# Patient Record
Sex: FEMALE | Race: OTHER | Employment: FULL TIME | URBAN - METROPOLITAN AREA
[De-identification: names, ages, dates, MRNs, and addresses within clinical notes are randomized per-mention and may not be internally consistent; named-entity substitution may affect disease eponyms.]

---

## 2018-03-07 ENCOUNTER — TELEPHONE (OUTPATIENT)
Dept: CARDIOLOGY CLINIC | Age: 48
End: 2018-03-07

## 2018-03-07 NOTE — TELEPHONE ENCOUNTER
Left message requesting patient call back and leave physician's contact information for our office to request records.       Future Appointments  Date Time Provider Shmuel Jerica   3/8/2018 2:40 PM Mariah Coleman  [address]

## 2018-03-07 NOTE — TELEPHONE ENCOUNTER
Called patient. Verified patient's identity with two identifiers. Patient states she was at [facility]. She will bring a copy of her ekg that she is picking up. I told her I would also request records. Patient verbalizes understanding and denies further questions or concerns.        Faxed STAT records request.

## 2018-03-08 ENCOUNTER — CLINICAL SUPPORT (OUTPATIENT)
Dept: CARDIOLOGY CLINIC | Age: 48
End: 2018-03-08

## 2018-03-08 ENCOUNTER — TELEPHONE (OUTPATIENT)
Dept: CARDIOLOGY CLINIC | Age: 48
End: 2018-03-08

## 2018-03-08 ENCOUNTER — OFFICE VISIT (OUTPATIENT)
Dept: CARDIOLOGY CLINIC | Age: 48
End: 2018-03-08

## 2018-03-08 VITALS
HEART RATE: 85 BPM | HEIGHT: 62 IN | BODY MASS INDEX: 39.56 KG/M2 | SYSTOLIC BLOOD PRESSURE: 123 MMHG | OXYGEN SATURATION: 97 % | DIASTOLIC BLOOD PRESSURE: 80 MMHG | WEIGHT: 215 LBS

## 2018-03-08 DIAGNOSIS — R06.02 SOB (SHORTNESS OF BREATH): ICD-10-CM

## 2018-03-08 DIAGNOSIS — Z82.49 FAMILY HISTORY OF PREMATURE CAD: ICD-10-CM

## 2018-03-08 DIAGNOSIS — R00.2 PALPITATIONS: Primary | ICD-10-CM

## 2018-03-08 DIAGNOSIS — R42 DIZZY: ICD-10-CM

## 2018-03-08 DIAGNOSIS — R94.31 ABNORMAL EKG: ICD-10-CM

## 2018-03-08 RX ORDER — NAPROXEN 500 MG/1
500 TABLET ORAL AS NEEDED
COMMUNITY

## 2018-03-08 RX ORDER — ESOMEPRAZOLE MAGNESIUM 40 MG/1
CAPSULE, DELAYED RELEASE ORAL DAILY
COMMUNITY

## 2018-03-08 RX ORDER — LORAZEPAM 0.5 MG/1
TABLET ORAL AS NEEDED
COMMUNITY

## 2018-03-08 RX ORDER — QUETIAPINE FUMARATE 25 MG/1
75 TABLET, FILM COATED ORAL AS NEEDED
COMMUNITY

## 2018-03-08 RX ORDER — DULOXETIN HYDROCHLORIDE 60 MG/1
60 CAPSULE, DELAYED RELEASE ORAL DAILY
COMMUNITY

## 2018-03-08 NOTE — PROGRESS NOTES
HISTORY OF PRESENT ILLNESS  Ann Fajardo is a 51 y.o. female     SUMMARY:   Problem List  Date Reviewed: 3/8/2018    None          No current outpatient prescriptions on file prior to visit. No current facility-administered medications on file prior to visit. CARDIOLOGY STUDIES TO DATE:  none  Chief Complaint   Patient presents with    Rapid Heart Rate     HPI :  Ms. Roberta Cat is a 51year-old referred from [facility] for cardiac evaluation. On 03/04/2018 she had an episode where she felt dizzy and lightheaded and her arms were tingling. She ended up at [facility].  She was not orthostatic. Her heart rate was initially 108 beats per minute and her Chem-6 and CBC were normal, except for slightly low hemoglobin of 11.5. EKG showed sinus rhythm with a Q-wave in lead III and an insignificant Q-wave in aVF. She cannot remember having a prior EKG. For years, she has had problems with shortness of breath with activity, as well as palpitations occurring once a week and lasting from one to ten minutes, which she has always attributed to anxiety. She has random episodes of chest pressure that occur with and without activity and are not associated with any other symptoms. She has a history of a heart murmur. She has gained more than 20 pounds over the last year and gets no regular exercise. Her brother had a heart attack at age 54. There is no history of hypertension or diabetes. She thinks her cholesterol has been okay and she has never smoked.         CARDIAC ROS:   negative for syncope, orthopnea, paroxysmal nocturnal dyspnea, exertional chest pressure/discomfort, claudication, lower extremity edema    Family History   Problem Relation Age of Onset    Heart Attack Brother 54     smoker       Past Medical History:   Diagnosis Date    Anxiety        GENERAL ROS:  A comprehensive review of systems was negative except for: Gastrointestinal: positive for reflux symptoms, nausea and constipation  Genitourinary: positive for frequency  Behvioral/Psych: positive for anxiety, depression and sleep disturbance    Visit Vitals    /80    Pulse 85    Ht 5' 1.5\" (1.562 m)    Wt 215 lb (97.5 kg)    SpO2 97%    BMI 39.97 kg/m2       Wt Readings from Last 3 Encounters:   03/08/18 215 lb (97.5 kg)            BP Readings from Last 3 Encounters:   03/08/18 123/80       PHYSICAL EXAM  General appearance: alert, cooperative, no distress, appears stated age  Neurologic: Alert and oriented X 3  Neck: supple, symmetrical, trachea midline, no adenopathy, no carotid bruit and no JVD  Lungs: clear to auscultation bilaterally  Heart: regular rate and rhythm, S1, S2 normal, no murmur, click, rub or gallop  Abdomen: soft, non-tender. Bowel sounds normal. No masses,  no organomegaly  Extremities: extremities normal, atraumatic, no cyanosis or edema  Pulses: 2+ and symmetric    Lab Results   Component Value Date/Time    Cholesterol, total 184 11/12/2009 09:20 AM    HDL Cholesterol 60 11/12/2009 09:20 AM    LDL, calculated 112.8 (H) 11/12/2009 09:20 AM    Triglyceride 56 11/12/2009 09:20 AM    CHOL/HDL Ratio 3.1 11/12/2009 09:20 AM     ASSESSMENT  Ms. Kiki Allred dizzy episode does not sound cardiac given her history and the fact she was not having any palpitations at that time. I suspect it was some sort of anxiety related issue given her history. Given her history of a murmur and her abnormal EKG. I do think she needs an echocardiogram and we are going to provide her with an event monitor to see if these palpitations are anything more than simple PVCs or premature atrial contractions. I do not think she needs stress testing at this point. current treatment plan is effective, no change in therapy  lab results and schedule of future lab studies reviewed with patient  reviewed diet, exercise and weight control    Encounter Diagnoses   Name Primary?     Palpitations Yes    SOB (shortness of breath)     Abnormal EKG     Family history of premature CAD     Dizzy      Orders Placed This Encounter    2D ECHO COMPLETE ADULT (TTE) W OR WO CONTR    QUEtiapine (SEROQUEL) 25 mg tablet    LORazepam (ATIVAN) 0.5 mg tablet    fluticasone (FLOVENT DISKUS) 50 mcg/actuation inhaler    DULoxetine (CYMBALTA) 60 mg capsule    esomeprazole (NEXIUM) 40 mg capsule    BUTALB/ACETAMINOPHEN/CAFFEINE (BUTALBITAL-ACETAMINOPHEN-CAFF PO)    naproxen (NAPROSYN) 500 mg tablet       Follow-up Disposition:  Return in about 6 weeks (around 4/19/2018).     Nikki Maxwell MD  3/8/2018

## 2018-03-08 NOTE — TELEPHONE ENCOUNTER
Dr. Ольга Landeros would like patient to have an event monitor mailed to her dx palpitations, SOB, dizziness.

## 2018-03-08 NOTE — MR AVS SNAPSHOT
[address] [facility] [address] [place] 
[phone] Patient: Jordyn Darden MRN: [id number] :[id number] Visit Information Date & Time Provider Department Dept. Phone Encounter #  
 3/8/2018  2:40 PM Jeison Slaughter MD [facility]emaker [phone] [id number] Follow-up Instructions Return in about 6 weeks (around 2018). Your Appointments 3/8/2018  2:40 PM  
New Patient with Jeison Slaughter MD  
[facility] ([address] [facility]) Appt Note: np ref by Dr. Curtis Bachelor dx elevated heart rate [address]  [address] [place]  
[address] [address] Upcoming Health Maintenance Date Due DTaP/Tdap/Td series (1 - Tdap) 1990 PAP AKA CERVICAL CYTOLOGY 1990 Influenza Age 5 to Adult 2017 Allergies as of 3/8/2018  Review Complete On: 3/8/2018 By: Jeison Slaughter MD  
  
 Severity Noted Reaction Type Reactions Other Medication  2018    Palpitations Narcotics- side effects: itchiness, palpitations Other Plant, Animal, Environmental  2018    Itching Current Immunizations  Never Reviewed No immunizations on file. Not reviewed this visit You Were Diagnosed With   
  
 Codes Comments Palpitations    -  Primary ICD-10-CM: R00.2 ICD-9-CM: 785.1 Vitals BP Pulse Height(growth percentile) Weight(growth percentile) SpO2 BMI  
 123/80 85 5' 1.5\" (1.562 m) 215 lb (97.5 kg) 97% 39.97 kg/m2 Smoking Status Never Smoker Vitals History BMI and BSA Data Body Mass Index Body Surface Area  
 39.97 kg/m 2 2.06 m 2 Preferred Pharmacy Pharmacy Name Phone [facility] - [place] [facility] [phone] Your Updated Medication List  
  
   
 This list is accurate as of 3/8/18  2:37 PM.  Always use your most recent med list.  
  
  
  
  
 BUTALBITAL-ACETAMINOPHEN-CAFF PO Take  by mouth as needed. CYMBALTA 60 mg capsule Generic drug:  DULoxetine Take 60 mg by mouth daily. esomeprazole 40 mg capsule Commonly known as:  Elizabeth Browner Take  by mouth daily. fluticasone 50 mcg/actuation inhaler Commonly known as:  FLOVENT DISKUS Take  by inhalation. LORazepam 0.5 mg tablet Commonly known as:  ATIVAN Take  by mouth. naproxen 500 mg tablet Commonly known as:  NAPROSYN Take 500 mg by mouth as needed. QUEtiapine 25 mg tablet Commonly known as:  SEROquel Take 75 mg by mouth as needed. Follow-up Instructions Return in about 6 weeks (around 4/19/2018). Introducing [facility]! [facility] introduces [employer] patient portal. Now you can access parts of your medical record, email your doctor's office, and request medication refills online. 1. In your internet browser, go to [email or web address] 2. Click on the First Time User? Click Here link in the Sign In box. You will see the New Member Sign Up page. 3. Enter your [employer] Access Code exactly as it appears below. You will not need to use this code after youve completed the sign-up process. If you do not sign up before the expiration date, you must request a new code. · [employer] Access Code: [email or web address] Expires: 6/6/2018  2:37 PM 
 
4. Enter the last four digits of your Social Security Number (xxxx) and Date of Birth (mm/dd/yyyy) as indicated and click Submit. You will be taken to the next sign-up page. 5. Create a [employer] ID. This will be your [employer] login ID and cannot be changed, so think of one that is secure and easy to remember. 6. Create a [employer] password. You can change your password at any time. 7. Enter your Password Reset Question and Answer.  This can be used at a later time if you forget your password. 8. Enter your e-mail address. You will receive e-mail notification when new information is available in [address] 9. Click Sign Up. You can now view and download portions of your medical record. 10. Click the Download Summary menu link to download a portable copy of your medical information. If you have questions, please visit the Frequently Asked Questions section of the [employer] website. Remember, [employer] is NOT to be used for urgent needs. For medical emergencies, dial 911. Now available from your iPhone and Android! Please provide this summary of care documentation to your next provider. Your primary care clinician is listed as Jayla Zavala. If you have any questions after today's visit, please call [phone].

## 2018-03-12 ENCOUNTER — CLINICAL SUPPORT (OUTPATIENT)
Dept: CARDIOLOGY CLINIC | Age: 48
End: 2018-03-12

## 2018-03-12 DIAGNOSIS — R00.2 PALPITATIONS: ICD-10-CM

## 2018-03-13 ENCOUNTER — TELEPHONE (OUTPATIENT)
Dept: CARDIOLOGY CLINIC | Age: 48
End: 2018-03-13

## 2018-03-13 NOTE — TELEPHONE ENCOUNTER
Called patient. Verified patient's identity with two identifiers. Notified patient of results. Patient verbalizes understanding and denies further questions or concerns.

## 2018-03-13 NOTE — TELEPHONE ENCOUNTER
----- Message from Estefania Zamarripa MD sent at 3/13/2018  4:47 PM EDT -----  Heart muscle is strong and valves all ok.  Looks great

## 2018-04-16 ENCOUNTER — TELEPHONE (OUTPATIENT)
Dept: CARDIOLOGY CLINIC | Age: 48
End: 2018-04-16

## 2018-04-16 NOTE — TELEPHONE ENCOUNTER
Pt called to cancel her appointment on 4/19 because she doesn't know if her results from her monitor will be back by then. Please give her a call back and let her know if she needs to reschedule or keep the appointment @ [phone]. Thanks!

## 2018-04-16 NOTE — TELEPHONE ENCOUNTER
Called patient. Verified patient's identity with two identifiers. I told her she can keep appointment. Patient verbalizes understanding and denies further questions or concerns.        Future Appointments  Date Time Provider Shmuel Pizano   4/19/2018 1:20 PM Nikhil Hernández  [address]

## 2018-04-19 ENCOUNTER — OFFICE VISIT (OUTPATIENT)
Dept: CARDIOLOGY CLINIC | Age: 48
End: 2018-04-19

## 2018-04-19 VITALS
OXYGEN SATURATION: 96 % | DIASTOLIC BLOOD PRESSURE: 84 MMHG | HEART RATE: 92 BPM | SYSTOLIC BLOOD PRESSURE: 118 MMHG | WEIGHT: 210.4 LBS | HEIGHT: 62 IN | RESPIRATION RATE: 16 BRPM | BODY MASS INDEX: 38.72 KG/M2

## 2018-04-19 DIAGNOSIS — R00.2 PALPITATIONS: Primary | ICD-10-CM

## 2018-04-19 DIAGNOSIS — R42 DIZZY: ICD-10-CM

## 2018-04-19 DIAGNOSIS — R06.02 SOB (SHORTNESS OF BREATH): ICD-10-CM

## 2018-04-19 DIAGNOSIS — Z82.49 FAMILY HISTORY OF PREMATURE CAD: ICD-10-CM

## 2018-04-19 NOTE — MR AVS SNAPSHOT
[address] [address] 
[phone] Patient: Scooter Larios MRN: [id number] :[id number] Visit Information Date & Time Provider Department Dept. Phone Encounter #  
 2018  1:20 PM Jag Pinzon MD [facility] [phone] [id number] Follow-up Instructions Return if symptoms worsen or fail to improve. Upcoming Health Maintenance Date Due DTaP/Tdap/Td series (1 - Tdap) 1990 PAP AKA CERVICAL CYTOLOGY 1990 Influenza Age 5 to Adult 2017 Allergies as of 2018  Review Complete On: 2018 By: Jag Pinzon MD  
  
 Severity Noted Reaction Type Reactions Other Medication  2018    Palpitations Narcotics- side effects: itchiness, palpitations Other Plant, Animal, Environmental  2018    Itching Current Immunizations  Never Reviewed No immunizations on file. Not reviewed this visit You Were Diagnosed With   
  
 Codes Comments Palpitations    -  Primary ICD-10-CM: R00.2 ICD-9-CM: 785.1 SOB (shortness of breath)     ICD-10-CM: R06.02 
ICD-9-CM: 786.05 Family history of premature CAD     ICD-10-CM: Z82.49 
ICD-9-CM: V17.3 Dizzy     ICD-10-CM: R44 ICD-9-CM: 780.4 Vitals BP Pulse Resp Height(growth percentile) Weight(growth percentile) SpO2  
 118/84 (BP 1 Location: Left arm) 92 16 5' 1.5\" (1.562 m) 210 lb 6.4 oz (95.4 kg) 96% BMI Smoking Status 39.11 kg/m2 Never Smoker Vitals History BMI and BSA Data Body Mass Index Body Surface Area  
 39.11 kg/m 2 2.03 m 2 Preferred Pharmacy Pharmacy Name Phone [facility] - [place], [place] - [address], [facility] [phone] Your Updated Medication List  
  
   
This list is accurate as of 18  1:30 PM.  Always use your most recent med list.  
  
  
  
  
 BUTALBITAL-ACETAMINOPHEN-CAFF PO Take  by mouth as needed. CYMBALTA 60 mg capsule Generic drug:  DULoxetine Take 60 mg by mouth daily. esomeprazole 40 mg capsule Commonly known as:  Ayala Brod Take  by mouth daily. fluticasone 50 mcg/actuation inhaler Commonly known as:  FLOVENT DISKUS Take  by inhalation as needed. LORazepam 0.5 mg tablet Commonly known as:  ATIVAN Take  by mouth as needed. naproxen 500 mg tablet Commonly known as:  NAPROSYN Take 500 mg by mouth as needed. QUEtiapine 25 mg tablet Commonly known as:  SEROquel Take 75 mg by mouth as needed. Follow-up Instructions Return if symptoms worsen or fail to improve. Introducing [facility]! Michelle Landeros introduces [employer] patient portal. Now you can access parts of your medical record, email your doctor's office, and request medication refills online. 1. In your internet browser, go to [email or web address] 2. Click on the First Time User? Click Here link in the Sign In box. You will see the New Member Sign Up page. 3. Enter your [employer] Access Code exactly as it appears below. You will not need to use this code after youve completed the sign-up process. If you do not sign up before the expiration date, you must request a new code. · [employer] Access Code: [email or web address] Expires: 6/6/2018  3:37 PM 
 
4. Enter the last four digits of your Social Security Number (xxxx) and Date of Birth (mm/dd/yyyy) as indicated and click Submit. You will be taken to the next sign-up page. 5. Create a [employer] ID. This will be your [employer] login ID and cannot be changed, so think of one that is secure and easy to remember. 6. Create a [employer] password. You can change your password at any time. 7. Enter your Password Reset Question and Answer. This can be used at a later time if you forget your password. 8. Enter your e-mail address. You will receive e-mail notification when new information is available in [address] 9. Click Sign Up. You can now view and download portions of your medical record. 10. Click the Download Summary menu link to download a portable copy of your medical information. If you have questions, please visit the Frequently Asked Questions section of the [employer] website. Remember, [employer] is NOT to be used for urgent needs. For medical emergencies, dial 911. Now available from your iPhone and Android! Please provide this summary of care documentation to your next provider. Your primary care clinician is listed as Kwame Guillen. If you have any questions after today's visit, please call [phone].

## 2018-04-19 NOTE — PROGRESS NOTES
HISTORY OF PRESENT ILLNESS  Medardo Rosas is a 51 y.o. female     SUMMARY:   Problem List  Date Reviewed: 4/19/2018          Codes Class Noted    Palpitations ICD-10-CM: R00.2  ICD-9-CM: 785.1  4/19/2018    Overview Addendum 4/19/2018  7:52 AM by General Shabbir MD     3/18 normal echo  4/18 event monitor, nsr, sinus tach                   Current Outpatient Prescriptions on File Prior to Visit   Medication Sig    QUEtiapine (SEROQUEL) 25 mg tablet Take 75 mg by mouth as needed.  LORazepam (ATIVAN) 0.5 mg tablet Take  by mouth as needed.  fluticasone (FLOVENT DISKUS) 50 mcg/actuation inhaler Take  by inhalation as needed.  DULoxetine (CYMBALTA) 60 mg capsule Take 60 mg by mouth daily.  esomeprazole (NEXIUM) 40 mg capsule Take  by mouth daily.  BUTALB/ACETAMINOPHEN/CAFFEINE (BUTALBITAL-ACETAMINOPHEN-CAFF PO) Take  by mouth as needed.  naproxen (NAPROSYN) 500 mg tablet Take 500 mg by mouth as needed. No current facility-administered medications on file prior to visit. CARDIOLOGY STUDIES TO DATE:  3/18 normal echo  4/18 event monitor, nsr, sinus tach        Chief Complaint   Patient presents with    Palpitations     HPI :  Ms. Ana Paula Valdovinos is doing okay, though she is still very fatigued and stressed out. Occasional palpitations and she had some when her event monitor was on and all that was detected was some sinus tachycardia. Atypical random chest pain persists. CARDIAC ROS:   negative for dyspnea, syncope, orthopnea, paroxysmal nocturnal dyspnea, exertional chest pressure/discomfort, claudication, lower extremity edema    Family History   Problem Relation Age of Onset    Heart Attack Brother 54     smoker       Past Medical History:   Diagnosis Date    Anxiety        GENERAL ROS:  A comprehensive review of systems was negative except for that written in the HPI.     Visit Vitals    /84 (BP 1 Location: Left arm)    Pulse 92    Resp 16    Ht 5' 1.5\" (1.562 m)    Wt 210 lb 6.4 oz (95.4 kg)    SpO2 96%    BMI 39.11 kg/m2       Wt Readings from Last 3 Encounters:   04/19/18 210 lb 6.4 oz (95.4 kg)   03/08/18 215 lb (97.5 kg)            BP Readings from Last 3 Encounters:   04/19/18 118/84   03/08/18 123/80       PHYSICAL EXAM  General appearance: alert, cooperative, no distress, appears stated age  Neck: supple, symmetrical, trachea midline, no adenopathy, no carotid bruit and no JVD  Lungs: clear to auscultation bilaterally  Heart: regular rate and rhythm, S1, S2 normal, no murmur, click, rub or gallop  Extremities: extremities normal, atraumatic, no cyanosis or edema    Lab Results   Component Value Date/Time    Cholesterol, total 184 11/12/2009 09:20 AM    HDL Cholesterol 60 11/12/2009 09:20 AM    LDL, calculated 112.8 (H) 11/12/2009 09:20 AM    Triglyceride 56 11/12/2009 09:20 AM    CHOL/HDL Ratio 3.1 11/12/2009 09:20 AM     ASSESSMENT  Ms. Michelle Silva is stable and minimally symptomatic with reassuringly normal testing and we went over that in detail. At this point, she needs no further cardiac testing or treatment. current treatment plan is effective, no change in therapy  lab results and schedule of future lab studies reviewed with patient  reviewed diet, exercise and weight control    Encounter Diagnoses   Name Primary?  Palpitations Yes    SOB (shortness of breath)     Family history of premature CAD     Dizzy      No orders of the defined types were placed in this encounter. Follow-up Disposition:  Return if symptoms worsen or fail to improve.     Mitzy Chao MD  4/19/2018

## 2020-12-28 ENCOUNTER — TRANSCRIBE ORDER (OUTPATIENT)
Dept: SCHEDULING | Age: 50
End: 2020-12-28

## 2020-12-28 DIAGNOSIS — J32.0 CHRONIC MAXILLARY SINUSITIS: Primary | ICD-10-CM

## 2021-01-04 ENCOUNTER — HOSPITAL ENCOUNTER (OUTPATIENT)
Dept: CT IMAGING | Age: 51
Discharge: HOME | End: 2021-01-04
Payer: COMMERCIAL

## 2021-01-04 DIAGNOSIS — J32.0 CHRONIC MAXILLARY SINUSITIS: ICD-10-CM

## 2021-01-04 PROCEDURE — 70486 CT MAXILLOFACIAL W/O DYE: CPT | Performed by: OTOLARYNGOLOGY

## 2022-03-18 PROBLEM — R00.2 PALPITATIONS: Status: ACTIVE | Noted: 2018-04-19

## 2023-05-12 RX ORDER — LORAZEPAM 0.5 MG/1
TABLET ORAL PRN
COMMUNITY

## 2023-05-12 RX ORDER — ESOMEPRAZOLE MAGNESIUM 40 MG/1
CAPSULE, DELAYED RELEASE ORAL DAILY
COMMUNITY

## 2023-05-12 RX ORDER — QUETIAPINE FUMARATE 25 MG/1
TABLET, FILM COATED ORAL PRN
COMMUNITY

## 2023-05-12 RX ORDER — NAPROXEN 500 MG/1
500 TABLET ORAL PRN
COMMUNITY

## 2023-05-12 RX ORDER — DULOXETIN HYDROCHLORIDE 60 MG/1
60 CAPSULE, DELAYED RELEASE ORAL DAILY
COMMUNITY